# Patient Record
Sex: FEMALE | Race: AMERICAN INDIAN OR ALASKA NATIVE
[De-identification: names, ages, dates, MRNs, and addresses within clinical notes are randomized per-mention and may not be internally consistent; named-entity substitution may affect disease eponyms.]

---

## 2018-10-11 ENCOUNTER — HOSPITAL ENCOUNTER (EMERGENCY)
Dept: HOSPITAL 5 - ED | Age: 33
LOS: 1 days | Discharge: HOME | End: 2018-10-12
Payer: SELF-PAY

## 2018-10-11 DIAGNOSIS — I10: ICD-10-CM

## 2018-10-11 DIAGNOSIS — F41.0: Primary | ICD-10-CM

## 2018-10-11 DIAGNOSIS — Z87.891: ICD-10-CM

## 2018-10-11 LAB
BASOPHILS # (AUTO): 0.1 K/MM3 (ref 0–0.1)
BASOPHILS NFR BLD AUTO: 0.7 % (ref 0–1.8)
EOSINOPHIL # BLD AUTO: 0.2 K/MM3 (ref 0–0.4)
EOSINOPHIL NFR BLD AUTO: 1.5 % (ref 0–4.3)
HCT VFR BLD CALC: 33.4 % (ref 30.3–42.9)
HGB BLD-MCNC: 12.1 GM/DL (ref 10.1–14.3)
LYMPHOCYTES # BLD AUTO: 4 K/MM3 (ref 1.2–5.4)
LYMPHOCYTES NFR BLD AUTO: 36.4 % (ref 13.4–35)
MCH RBC QN AUTO: 29 PG (ref 28–32)
MCHC RBC AUTO-ENTMCNC: 36 % (ref 30–34)
MCV RBC AUTO: 80 FL (ref 79–97)
MONOCYTES # (AUTO): 0.7 K/MM3 (ref 0–0.8)
MONOCYTES % (AUTO): 6.1 % (ref 0–7.3)
PLATELET # BLD: 404 K/MM3 (ref 140–440)
RBC # BLD AUTO: 4.18 M/MM3 (ref 3.65–5.03)

## 2018-10-11 PROCEDURE — 85025 COMPLETE CBC W/AUTO DIFF WBC: CPT

## 2018-10-11 PROCEDURE — 80048 BASIC METABOLIC PNL TOTAL CA: CPT

## 2018-10-11 PROCEDURE — 36415 COLL VENOUS BLD VENIPUNCTURE: CPT

## 2018-10-11 PROCEDURE — 84484 ASSAY OF TROPONIN QUANT: CPT

## 2018-10-11 PROCEDURE — 93005 ELECTROCARDIOGRAM TRACING: CPT

## 2018-10-11 PROCEDURE — 84703 CHORIONIC GONADOTROPIN ASSAY: CPT

## 2018-10-11 PROCEDURE — 93010 ELECTROCARDIOGRAM REPORT: CPT

## 2018-10-12 VITALS — DIASTOLIC BLOOD PRESSURE: 91 MMHG | SYSTOLIC BLOOD PRESSURE: 147 MMHG

## 2018-10-12 LAB
BUN SERPL-MCNC: 10 MG/DL (ref 7–17)
BUN/CREAT SERPL: 17 %
CALCIUM SERPL-MCNC: 9.9 MG/DL (ref 8.4–10.2)
HEMOLYSIS INDEX: 10

## 2018-10-12 NOTE — EMERGENCY DEPARTMENT REPORT
ED Palpitations HPI





- General


Chief Complaint: Chest Pain


Stated Complaint: AXNIETY


Time Seen by Provider: 10/12/18 01:16


Source: patient


Mode of arrival: Ambulatory


Limitations: No Limitations





- History of Present Illness


Initial Comments: 





32-year-old female with a past medical history of obesity, hypertension, and 

anxiety/panic attacks presents to the hospital complaining of anxiety attack 

and elevated blood pressure.  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 

has been very stressed out at work today with the children.  She felt the 

stress building and this evening began to have palpitations.  She denies nausea

, vomiting, diaphoresis, chest pain, or shortness of breath.  She has had 

similar symptoms secondary to panic attack in the past that she felt that this 

one was more intense.  Since waiting in the ED symptoms have spontaneously 

resolved and she feels back to normal.  Patient states that she has been on 2 

blood pressure medications for the past one month and her blood pressure has 

still remained elevated.  She was started on hydrochlorothiazide on the 15th 

and is concerned that her blood pressure is still elevated.  Prior to 

initiation of hydrochlorothiazide, her BP in the doctor's office was 170s/130s 

as per office visit paperwork.  She denies recent travel, or control pill use, 

calf tenderness/edema, or history of PE/DVT.





- Related Data


 Home Medications











 Medication  Instructions  Recorded  Confirmed  Last Taken


 


Losartan [Cozaar] 50 mg PO QDAY 10/11/18 10/11/18 10/11/18


 


NIFEdipine [Nifedipine ER] 30 mg PO DAILY 10/11/18 10/11/18 10/11/18


 


hydroCHLOROthiazide 12.5 mg PO DAILY 10/11/18 10/11/18 10/11/18





[Hydrochlorothiazide]    











 Allergies











Allergy/AdvReac Type Severity Reaction Status Date / Time


 


No Known Allergies Allergy   Verified 12/01/14 22:05














ED Review of Systems


ROS: 


Stated complaint: AXNIETY


Other details as noted in HPI





Comment: All other systems reviewed and negative





ED Past Medical Hx





- Past Medical History


Previous Medical History?: Yes


Hx Hypertension: Yes





- Surgical History


Past Surgical History?: No





- Social History


Smoking Status: Former Smoker


Substance Use Type: Alcohol





- Medications


Home Medications: 


 Home Medications











 Medication  Instructions  Recorded  Confirmed  Last Taken  Type


 


Losartan [Cozaar] 50 mg PO QDAY 10/11/18 10/11/18 10/11/18 History


 


NIFEdipine [Nifedipine ER] 30 mg PO DAILY 10/11/18 10/11/18 10/11/18 History


 


hydroCHLOROthiazide 12.5 mg PO DAILY 10/11/18 10/11/18 10/11/18 History





[Hydrochlorothiazide]     














ED Physical Exam





- General


Limitations: No Limitations





- Other


Other exam information: 





General: No limitations, patient is alert in no acute distress


Head exam: Atraumatic, normocephalic


Eyes exam: Normal appearance, pupils equal reactive to light, extraocular 

movements intact


ENT: Moist mucous membrane


Neck exam: Normal inspection, full range of motion, no meningismus nontender


Respiratory exam: Clear to auscultation bilateral, no wheezes, rales, crackles


Cardiovascular: Normal rate and rhythm, normal heart sounds


Abdomen: Soft, nondistended, and  nontender, with normal bowel sounds, no 

rebound, or guarding


Extremity: Full range of motion normal inspection no deformity, tenderness or 

edema


Back: Normal Inspection, full range of motion, no tenderness


Neurologic: Alert, oriented x3, cranial nerves intact, no motor or sensory 

deficit


Psychiatric: normal affect, normal mood


Skin: Warm, dry, intact





ED Course





 Vital Signs











  10/11/18 10/12/18





  22:57 01:40


 


Temperature 98.1 F 97.3 F L


 


Pulse Rate 119 H 83


 


Respiratory 16 18





Rate  


 


Blood Pressure 177/109 


 


Blood Pressure  147/91





[Right]  


 


O2 Sat by Pulse 98 100





Oximetry  














ED Medical Decision Making





- Lab Data


Result diagrams: 


 10/11/18 23:12





 10/11/18 23:12








 Lab Results











  10/11/18 10/11/18 10/11/18 Range/Units





  23:12 23:12 23:13 


 


WBC  11.0    (4.5-11.0)  K/mm3


 


RBC  4.18    (3.65-5.03)  M/mm3


 


Hgb  12.1    (10.1-14.3)  gm/dl


 


Hct  33.4    (30.3-42.9)  %


 


MCV  80    (79-97)  fl


 


MCH  29    (28-32)  pg


 


MCHC  36 H    (30-34)  %


 


RDW  14.9    (13.2-15.2)  %


 


Plt Count  404    (140-440)  K/mm3


 


Lymph % (Auto)  36.4 H    (13.4-35.0)  %


 


Mono % (Auto)  6.1    (0.0-7.3)  %


 


Eos % (Auto)  1.5    (0.0-4.3)  %


 


Baso % (Auto)  0.7    (0.0-1.8)  %


 


Lymph #  4.0    (1.2-5.4)  K/mm3


 


Mono #  0.7    (0.0-0.8)  K/mm3


 


Eos #  0.2    (0.0-0.4)  K/mm3


 


Baso #  0.1    (0.0-0.1)  K/mm3


 


Seg Neutrophils %  55.3    (40.0-70.0)  %


 


Seg Neutrophils #  6.1    (1.8-7.7)  K/mm3


 


Sodium   135 L   (137-145)  mmol/L


 


Potassium   3.9   (3.6-5.0)  mmol/L


 


Chloride   94.1 L   ()  mmol/L


 


Carbon Dioxide   26   (22-30)  mmol/L


 


Anion Gap   19   mmol/L


 


BUN   10   (7-17)  mg/dL


 


Creatinine   0.6 L   (0.7-1.2)  mg/dL


 


Estimated GFR   > 60   ml/min


 


BUN/Creatinine Ratio   17   %


 


Glucose   111 H   ()  mg/dL


 


Calcium   9.9   (8.4-10.2)  mg/dL


 


Troponin T   < 0.010   (0.00-0.029)  ng/mL


 


HCG, Qual    Negative  (Negative)  














- EKG Data


-: EKG Interpreted by Me


EKG shows normal: sinus rhythm, axis (qrs 62), QRS complexes (qrsd 99), ST-T 

waves (no stemi/t inv)





- EKG Data


When compared to previous EKG there are: no significant change (1/16/15)





- Medical Decision Making





Patient's symptoms spontaneously improved when she calmed down in the ED.  

Patient insists that the symptoms are similar to her previous panic attacks.  

Now that they have resolved her vital signs had improved.  Patient denies 

having any chest pain during the episode and states she only had palpitations.  

Patient was offered admission to the hospital for stress testing since she has 

not had a stress test in the past.  Patient declined.  Overall the patient's 

low risk for ACS given the unchanged EKG compared to 2015, negative troponin, 

and lack of chest pain and the fact that this is similar to her panic attacks 

in the past.  Patient will call her doctor tomorrow to schedule more urgent 

follow-up tomorrow or next week since her original follow-up appointment 

scheduled for 3 months from now.  Patient encouraged to return if symptoms 

worsen





- Differential Diagnosis


PE, panic attack, MI, angina, thyroid toxicosis, hypertensive urgency/emerg


Critical Care Time: No


Critical care attestation.: 


If time is entered above; I have spent that time in minutes in the direct care 

of this critically ill patient, excluding procedure time.








ED Disposition


Clinical Impression: 


 Panic attack, Hypertension





Disposition: DC-01 TO HOME OR SELFCARE


Is pt being admited?: No


Does the pt Need Aspirin: No


Condition: Stable


Instructions:  Hypertension (ED), Panic Disorder (ED)


Additional Instructions: 


Continue current medication as prescribed.  Continue to monitor and documents 

your blood pressure since recent changes were made to your medication.  Follow 

up with your doctor as soon as possible. You have declined admission for stress 

testing.  Please return if symptoms worsen as indicated by your discharge 

instructions.


Referrals: 


PRIMARY CARE,MD [Primary Care Provider] - 2-3 Days


Time of Disposition: 02:05

## 2020-01-08 ENCOUNTER — HOSPITAL ENCOUNTER (EMERGENCY)
Dept: HOSPITAL 5 - ED | Age: 35
Discharge: LEFT BEFORE DISCHARGE | End: 2020-01-08
Payer: SELF-PAY

## 2020-01-08 VITALS — SYSTOLIC BLOOD PRESSURE: 145 MMHG | DIASTOLIC BLOOD PRESSURE: 95 MMHG

## 2020-01-08 DIAGNOSIS — R06.02: Primary | ICD-10-CM

## 2020-01-08 DIAGNOSIS — Z53.21: ICD-10-CM

## 2020-01-08 LAB
BACTERIA #/AREA URNS HPF: (no result) /HPF
BASOPHILS # (AUTO): 0.1 K/MM3 (ref 0–0.1)
BASOPHILS NFR BLD AUTO: 0.5 % (ref 0–1.8)
BILIRUB UR QL STRIP: (no result)
BLOOD UR QL VISUAL: (no result)
BUN SERPL-MCNC: 7 MG/DL (ref 7–17)
BUN/CREAT SERPL: 14 %
CALCIUM SERPL-MCNC: 9.7 MG/DL (ref 8.4–10.2)
EOSINOPHIL # BLD AUTO: 0.1 K/MM3 (ref 0–0.4)
EOSINOPHIL NFR BLD AUTO: 1.1 % (ref 0–4.3)
HCT VFR BLD CALC: 39.7 % (ref 30.3–42.9)
HEMOLYSIS INDEX: 11
HGB BLD-MCNC: 13.2 GM/DL (ref 10.1–14.3)
LYMPHOCYTES # BLD AUTO: 3.1 K/MM3 (ref 1.2–5.4)
LYMPHOCYTES NFR BLD AUTO: 25.7 % (ref 13.4–35)
MCHC RBC AUTO-ENTMCNC: 34 % (ref 30–34)
MCV RBC AUTO: 82 FL (ref 79–97)
MONOCYTES # (AUTO): 0.6 K/MM3 (ref 0–0.8)
MONOCYTES % (AUTO): 4.6 % (ref 0–7.3)
PH UR STRIP: 7 [PH] (ref 5–7)
PLATELET # BLD: 420 K/MM3 (ref 140–440)
RBC # BLD AUTO: 4.87 M/MM3 (ref 3.65–5.03)
RBC #/AREA URNS HPF: 6 /HPF (ref 0–6)
UROBILINOGEN UR-MCNC: < 2 MG/DL (ref ?–2)
WBC #/AREA URNS HPF: 8 /HPF (ref 0–6)

## 2020-01-08 PROCEDURE — 81001 URINALYSIS AUTO W/SCOPE: CPT

## 2020-01-08 PROCEDURE — 80048 BASIC METABOLIC PNL TOTAL CA: CPT

## 2020-01-08 PROCEDURE — 81025 URINE PREGNANCY TEST: CPT

## 2020-01-08 PROCEDURE — 36415 COLL VENOUS BLD VENIPUNCTURE: CPT

## 2020-01-08 PROCEDURE — 85025 COMPLETE CBC W/AUTO DIFF WBC: CPT

## 2020-01-08 PROCEDURE — 93005 ELECTROCARDIOGRAM TRACING: CPT

## 2020-01-08 PROCEDURE — 93010 ELECTROCARDIOGRAM REPORT: CPT
